# Patient Record
Sex: MALE | Race: ASIAN | NOT HISPANIC OR LATINO | URBAN - METROPOLITAN AREA
[De-identification: names, ages, dates, MRNs, and addresses within clinical notes are randomized per-mention and may not be internally consistent; named-entity substitution may affect disease eponyms.]

---

## 2018-03-02 ENCOUNTER — OFFICE VISIT - HEALTHEAST (OUTPATIENT)
Dept: FAMILY MEDICINE | Facility: CLINIC | Age: 23
End: 2018-03-02

## 2018-03-02 DIAGNOSIS — Z00.00 ROUTINE GENERAL MEDICAL EXAMINATION AT A HEALTH CARE FACILITY: ICD-10-CM

## 2018-03-02 DIAGNOSIS — Z23 NEED FOR VACCINATION: ICD-10-CM

## 2018-03-02 ASSESSMENT — MIFFLIN-ST. JEOR: SCORE: 1478

## 2018-07-18 ENCOUNTER — COMMUNICATION - HEALTHEAST (OUTPATIENT)
Dept: FAMILY MEDICINE | Facility: CLINIC | Age: 23
End: 2018-07-18

## 2018-08-27 ENCOUNTER — OFFICE VISIT - HEALTHEAST (OUTPATIENT)
Dept: FAMILY MEDICINE | Facility: CLINIC | Age: 23
End: 2018-08-27

## 2018-08-27 DIAGNOSIS — Z01.84 IMMUNITY STATUS TESTING: ICD-10-CM

## 2018-08-27 DIAGNOSIS — Z11.1 SCREENING EXAMINATION FOR PULMONARY TUBERCULOSIS: ICD-10-CM

## 2018-08-27 DIAGNOSIS — Z23 NEED FOR VACCINATION: ICD-10-CM

## 2018-08-27 ASSESSMENT — MIFFLIN-ST. JEOR: SCORE: 1465.26

## 2018-08-28 LAB
HBV SURFACE AB SERPL IA-ACNC: POSITIVE M[IU]/ML
HBV SURFACE AG SERPL QL IA: NEGATIVE
RUBV IGG SERPL QL IA: POSITIVE

## 2018-08-29 ENCOUNTER — AMBULATORY - HEALTHEAST (OUTPATIENT)
Dept: NURSING | Facility: CLINIC | Age: 23
End: 2018-08-29

## 2018-08-29 ENCOUNTER — AMBULATORY - HEALTHEAST (OUTPATIENT)
Dept: FAMILY MEDICINE | Facility: CLINIC | Age: 23
End: 2018-08-29

## 2018-08-29 DIAGNOSIS — Z23 NEED FOR VACCINATION: ICD-10-CM

## 2018-08-29 LAB
GAMMA INTERFERON BACKGROUND BLD IA-ACNC: 0.19 IU/ML
HBV CORE AB SERPL QL IA: POSITIVE
M TB IFN-G BLD-IMP: NEGATIVE
MEV IGG SER IA-ACNC: POSITIVE
MITOGEN IGNF BCKGRD COR BLD-ACNC: 0.01 IU/ML
MITOGEN IGNF BCKGRD COR BLD-ACNC: 0.02 IU/ML
MUV IGG SER QL IA: POSITIVE
QTF INTERPRETATION: NORMAL
QTF MITOGEN - NIL: >10 IU/ML
VZV IGG SER QL IA: NORMAL

## 2018-10-24 ENCOUNTER — AMBULATORY - HEALTHEAST (OUTPATIENT)
Dept: NURSING | Facility: CLINIC | Age: 23
End: 2018-10-24

## 2019-07-31 ENCOUNTER — RECORDS - HEALTHEAST (OUTPATIENT)
Dept: LAB | Facility: HOSPITAL | Age: 24
End: 2019-07-31

## 2019-08-02 LAB
GAMMA INTERFERON BACKGROUND BLD IA-ACNC: 0.53 IU/ML
M TB IFN-G BLD-IMP: NEGATIVE
MITOGEN IGNF BCKGRD COR BLD-ACNC: -0.21 IU/ML
MITOGEN IGNF BCKGRD COR BLD-ACNC: -0.28 IU/ML
QTF INTERPRETATION: NORMAL
QTF MITOGEN - NIL: 9.04 IU/ML

## 2020-01-09 ENCOUNTER — AMBULATORY - HEALTHEAST (OUTPATIENT)
Dept: FAMILY MEDICINE | Facility: CLINIC | Age: 25
End: 2020-01-09

## 2020-01-09 DIAGNOSIS — Z20.828 EXPOSURE TO THE FLU: ICD-10-CM

## 2020-04-07 ENCOUNTER — OFFICE VISIT - HEALTHEAST (OUTPATIENT)
Dept: FAMILY MEDICINE | Facility: CLINIC | Age: 25
End: 2020-04-07

## 2020-04-07 DIAGNOSIS — J31.0 RHINITIS, UNSPECIFIED TYPE: ICD-10-CM

## 2020-11-27 ENCOUNTER — OFFICE VISIT - HEALTHEAST (OUTPATIENT)
Dept: FAMILY MEDICINE | Facility: CLINIC | Age: 25
End: 2020-11-27

## 2020-11-27 DIAGNOSIS — N50.89 LUMP IN TESTIS: ICD-10-CM

## 2020-11-27 DIAGNOSIS — Z23 NEED FOR IMMUNIZATION AGAINST INFLUENZA: ICD-10-CM

## 2020-11-27 DIAGNOSIS — Z00.00 ROUTINE GENERAL MEDICAL EXAMINATION AT A HEALTH CARE FACILITY: ICD-10-CM

## 2020-11-27 DIAGNOSIS — N04.9 NEPHROTIC SYNDROME WITH PATHOLOGICAL LESION IN KIDNEY: ICD-10-CM

## 2020-11-27 LAB
ALBUMIN UR-MCNC: NEGATIVE MG/DL
APPEARANCE UR: CLEAR
BILIRUB UR QL STRIP: NEGATIVE
COLOR UR AUTO: YELLOW
GLUCOSE UR STRIP-MCNC: NEGATIVE MG/DL
HGB UR QL STRIP: NEGATIVE
KETONES UR STRIP-MCNC: NEGATIVE MG/DL
LEUKOCYTE ESTERASE UR QL STRIP: NEGATIVE
NITRATE UR QL: NEGATIVE
PH UR STRIP: 6 [PH] (ref 5–8)
SP GR UR STRIP: 1.02 (ref 1–1.03)
UROBILINOGEN UR STRIP-ACNC: NORMAL

## 2020-11-27 ASSESSMENT — MIFFLIN-ST. JEOR: SCORE: 1573.92

## 2021-03-23 ENCOUNTER — OFFICE VISIT - HEALTHEAST (OUTPATIENT)
Dept: FAMILY MEDICINE | Facility: CLINIC | Age: 26
End: 2021-03-23

## 2021-03-23 DIAGNOSIS — R50.83 POST-VACCINATION FEVER: ICD-10-CM

## 2021-03-23 DIAGNOSIS — R59.0 AXILLARY ADENOPATHY: ICD-10-CM

## 2021-03-23 ASSESSMENT — MIFFLIN-ST. JEOR: SCORE: 1547.84

## 2021-03-25 ENCOUNTER — OFFICE VISIT - HEALTHEAST (OUTPATIENT)
Dept: FAMILY MEDICINE | Facility: CLINIC | Age: 26
End: 2021-03-25

## 2021-03-25 DIAGNOSIS — R53.83 FATIGUE, UNSPECIFIED TYPE: ICD-10-CM

## 2021-03-25 DIAGNOSIS — R22.32 AXILLARY MASS, LEFT: ICD-10-CM

## 2021-03-25 DIAGNOSIS — L50.9 HIVES: ICD-10-CM

## 2021-03-25 LAB
BASOPHILS # BLD AUTO: 0 THOU/UL (ref 0–0.2)
BASOPHILS NFR BLD AUTO: 1 % (ref 0–2)
EOSINOPHIL # BLD AUTO: 0.4 THOU/UL (ref 0–0.4)
EOSINOPHIL NFR BLD AUTO: 6 % (ref 0–6)
ERYTHROCYTE [DISTWIDTH] IN BLOOD BY AUTOMATED COUNT: 12.8 % (ref 11–14.5)
HCT VFR BLD AUTO: 44.2 % (ref 40–54)
HGB BLD-MCNC: 14.2 G/DL (ref 14–18)
IMM GRANULOCYTES # BLD: 0 THOU/UL
IMM GRANULOCYTES NFR BLD: 0 %
LYMPHOCYTES # BLD AUTO: 2.2 THOU/UL (ref 0.8–4.4)
LYMPHOCYTES NFR BLD AUTO: 33 % (ref 20–40)
MCH RBC QN AUTO: 25.7 PG (ref 27–34)
MCHC RBC AUTO-ENTMCNC: 32.1 G/DL (ref 32–36)
MCV RBC AUTO: 80 FL (ref 80–100)
MONOCYTES # BLD AUTO: 0.8 THOU/UL (ref 0–0.9)
MONOCYTES NFR BLD AUTO: 11 % (ref 2–10)
MONOCYTES NFR BLD AUTO: NEGATIVE %
NEUTROPHILS # BLD AUTO: 3.3 THOU/UL (ref 2–7.7)
NEUTROPHILS NFR BLD AUTO: 50 % (ref 50–70)
PLATELET # BLD AUTO: 288 THOU/UL (ref 140–440)
PMV BLD AUTO: 9.8 FL (ref 7–10)
RBC # BLD AUTO: 5.52 MILL/UL (ref 4.4–6.2)
WBC: 6.7 THOU/UL (ref 4–11)

## 2021-03-26 ENCOUNTER — OFFICE VISIT - HEALTHEAST (OUTPATIENT)
Dept: FAMILY MEDICINE | Facility: CLINIC | Age: 26
End: 2021-03-26

## 2021-03-26 DIAGNOSIS — J02.9 SORE THROAT: ICD-10-CM

## 2021-03-26 DIAGNOSIS — J02.0 STREPTOCOCCAL PHARYNGITIS: ICD-10-CM

## 2021-03-26 DIAGNOSIS — L50.9 HIVES: ICD-10-CM

## 2021-03-26 LAB — DEPRECATED S PYO AG THROAT QL EIA: ABNORMAL

## 2021-03-26 RX ORDER — PREDNISONE 20 MG/1
20 TABLET ORAL 2 TIMES DAILY
Qty: 10 TABLET | Refills: 0 | Status: SHIPPED | OUTPATIENT
Start: 2021-03-26

## 2021-03-26 ASSESSMENT — MIFFLIN-ST. JEOR: SCORE: 1547.56

## 2021-03-28 ENCOUNTER — COMMUNICATION - HEALTHEAST (OUTPATIENT)
Dept: SCHEDULING | Facility: CLINIC | Age: 26
End: 2021-03-28

## 2021-04-12 ENCOUNTER — HOSPITAL ENCOUNTER (OUTPATIENT)
Dept: ULTRASOUND IMAGING | Facility: HOSPITAL | Age: 26
Discharge: HOME | End: 2021-04-12
Attending: FAMILY MEDICINE

## 2021-04-12 DIAGNOSIS — N50.89 LUMP IN TESTIS: ICD-10-CM

## 2021-05-27 VITALS
TEMPERATURE: 98.8 F | RESPIRATION RATE: 12 BRPM | HEART RATE: 65 BPM | DIASTOLIC BLOOD PRESSURE: 70 MMHG | OXYGEN SATURATION: 99 % | SYSTOLIC BLOOD PRESSURE: 138 MMHG

## 2021-06-01 VITALS — WEIGHT: 132.25 LBS | HEIGHT: 62 IN | BODY MASS INDEX: 24.34 KG/M2

## 2021-06-02 VITALS — HEIGHT: 62 IN | BODY MASS INDEX: 23.69 KG/M2 | WEIGHT: 128.75 LBS

## 2021-06-05 VITALS
BODY MASS INDEX: 27.32 KG/M2 | HEIGHT: 62 IN | SYSTOLIC BLOOD PRESSURE: 116 MMHG | HEART RATE: 76 BPM | DIASTOLIC BLOOD PRESSURE: 60 MMHG | TEMPERATURE: 98.2 F | WEIGHT: 148.44 LBS | RESPIRATION RATE: 18 BRPM

## 2021-06-05 VITALS
WEIGHT: 148.5 LBS | RESPIRATION RATE: 18 BRPM | HEART RATE: 80 BPM | BODY MASS INDEX: 27.33 KG/M2 | SYSTOLIC BLOOD PRESSURE: 124 MMHG | TEMPERATURE: 99.3 F | DIASTOLIC BLOOD PRESSURE: 78 MMHG | HEIGHT: 62 IN

## 2021-06-05 VITALS
HEIGHT: 62 IN | SYSTOLIC BLOOD PRESSURE: 112 MMHG | TEMPERATURE: 98.2 F | DIASTOLIC BLOOD PRESSURE: 64 MMHG | WEIGHT: 154.25 LBS | RESPIRATION RATE: 18 BRPM | BODY MASS INDEX: 28.39 KG/M2 | OXYGEN SATURATION: 98 % | HEART RATE: 78 BPM

## 2021-06-07 NOTE — PROGRESS NOTES
"      Abilio Vitale is a 24 y.o. male who is being evaluated via a billable telephone visit.      The patient has been notified of following:     \"This telephone visit will be conducted via a call between you and your physician/provider. We have found that certain health care needs can be provided without the need for a physical exam.  This service lets us provide the care you need with a short phone conversation.  If a prescription is necessary we can send it directly to your pharmacy.  If lab work is needed we can place an order for that and you can then stop by our lab to have the test done at a later time.    If during the course of the call the physician/provider feels a telephone visit is not appropriate, you will not be charged for this service.\"     Physician has received verbal consent for a Telephone Visit from the patient? Yes    Abilio Vitale complains of    Chief Complaint   Patient presents with     Nasal Congestion     started on 4/6,      Nasal congestion and difficulty breathing through his noes.  Usually happens once or twice a year. No sore throat or fever or pain in face. Always happens in spring. No one else around is having similar symptoms.     He has been using benadryl at night, but it \"didn't work\" still feels stuffy in the morning.    He has not tried an Over-the-counter decongestant    H is wondering if he can return to work - I said given mild symptoms this is completely acceptable    ALLERGIES  Patient has no known allergies.        Assessment/Plan:  Discussed with patient that his symptoms could be consistent with either allergies or upper respiratory infection.  Discussed use of Over-the-counter decongestants and cautious use of Afrin nasal spray, no more than 3 days in a row.     He is fine to go to work    1. Rhinitis, unspecified type  - fluticasone (VERAMYST) 27.5 mcg/actuation nasal spray; 2 sprays once a day in each nostril  Dispense: 10 g; Refill: 1        Phone call duration:  " 10  minutes    Haylee Florez MD

## 2021-06-16 NOTE — PROGRESS NOTES
ASSESSMENT and plan   1. Sore throat  He has had a sore throat since yesterday rapid strep done today and Covid test.  No fever noted  - Rapid Strep A Screen- Throat Swab  - Symptomatic COVID-19 Virus (CORONAVIRUS) PCR    2. Streptococcal pharyngitis  Confirmed by rapid strep test amoxicillin started side effects of medication discussed  - amoxicillin (AMOXIL) 875 MG tablet; Take 1 tablet (875 mg total) by mouth 2 (two) times a day for 10 days.  Dispense: 20 tablet; Refill: 0    3. Hives    He developed hives throughout his body and his back legs arms and torso.  Seen in emergency room yesterday given cetirizine I have prescribed prednisone for him side effects discussed  - predniSONE (DELTASONE) 20 MG tablet; Take 20 mg by mouth 2 (two) times a day.  Dispense: 10 tablet; Refill: 0    There are no Patient Instructions on file for this visit.    Orders Placed This Encounter   Procedures     Rapid Strep A Screen- Throat Swab     Symptomatic COVID-19 Virus (CORONAVIRUS) PCR     Order Specific Question:   Reason?     Answer:   No Procedure     Order Specific Question:   Asymptomatic or Symptomatic:     Answer:   Symptomatic     Order Specific Question:   Symptomatic for COVID-19 as defined by CDC?     Answer:   Yes     Order Specific Question:   Employed in healthcare setting?     Answer:   Yes     Order Specific Question:   Employed or Provider/Resident/Faculty at [facility]?     Answer:   Yes - Employed or Provider/Resident/Faculty at [facility]     Order Specific Question:   Resident lives or works in a congregate care/living setting?     Answer:   No     COVID-19 Virus PCR MRF     There are no discontinued medications.    No follow-ups on file.    CHIEF COMPLAINT:  Chief Complaint   Patient presents with     Sore Throat       HISTORY OF PRESENT ILLNESS:  Abilio Knox is a 25 y.o. male who is here for a follow-up of the rash and body ache.  He also notes that he had a sore throat.  Patient noticed that he  "was having body ache and lymph node swelling in his left axilla after receiving his second coronavirus vaccine approximately 4 days ago.  Since then he has been having body ache and feeling fatigued.  He went to the urgent care yesterday because he was having a rash that was occurring throughout his body and was very itchy to give him cetirizine.  He says this morning he notes a sore throat occupational health would require him to have a Covid test before he can return to work on Monday.  He notes no diarrhea shortness of breath chest pain or headache    REVIEW OF SYSTEMS:     Skin positive for rash and body now developing into hives as mentioned in the HPI  Musculoskeletal positive for body ache  HEENT positive for sore throat  All other systems are negative.    PFSH:    Works at a medical clinic    TOBACCO USE:  Social History     Tobacco Use   Smoking Status Never Smoker   Smokeless Tobacco Never Used       VITALS:  Vitals:    03/26/21 1006   BP: 116/60   Pulse: 76   Resp: 18   Temp: 98.2  F (36.8  C)   TempSrc: Oral   Weight: 148 lb 7 oz (67.3 kg)   Height: 5' 2\" (1.575 m)     Wt Readings from Last 3 Encounters:   03/26/21 148 lb 7 oz (67.3 kg)   03/23/21 148 lb 8 oz (67.4 kg)   01/08/21 150 lb (68 kg)       PHYSICAL EXAM:  Interactive male sitting in exam room no acute distress  HEENT neck supple mucous membranes moist, erythema noted on the posterior pharyngeal wall is no lymph enlargement noted  Respiratory system clear to auscultation good breath sounds no wheezes no crackles  Skin multiple areas of urticaria noted on his forearms torso back legs and anterior chest wall signs of excoriation noted  CNS cranial nerves II to XII intact gait is normal reflexes are brisk    DATA REVIEWED:  Additional History from Old Records Summarized (2):   Decision to Obtain Records (1): 0  Radiology Tests Summarized or Ordered (1): 0  Labs Reviewed or Ordered (1): 1  Medicine Test Summarized or Ordered (1): 0  Independent " Review of EKG or X-RAY(2 each): 0    The visit lasted a total of 20 minutes     MEDICATIONS:  Current Outpatient Medications   Medication Sig Dispense Refill     acetaminophen (TYLENOL) 325 MG tablet Take 325-650 mg by mouth every 6 (six) hours as needed.       amoxicillin (AMOXIL) 875 MG tablet Take 1 tablet (875 mg total) by mouth 2 (two) times a day for 10 days. 20 tablet 0     fluticasone (VERAMYST) 27.5 mcg/actuation nasal spray 2 sprays once a day in each nostril 10 g 1     fluticasone propionate (FLONASE) 50 mcg/actuation nasal spray        predniSONE (DELTASONE) 20 MG tablet Take 20 mg by mouth 2 (two) times a day. 10 tablet 0     No current facility-administered medications for this visit.      Jerry kaur md

## 2021-06-16 NOTE — PROGRESS NOTES
[facility] Well Child Check    ASSESSMENT & PLAN  Abilio Vitale is a 22 y.o. who has normal growth and normal development.    Diagnoses and all orders for this visit:    Routine general medical examination at a health care facility    Need for vaccination  -     Influenza, Seasonal Quad, Preservative Free 36+ Months      Recheck in 1 year.    IMMUNIZATIONS/LABS  Immunizations were reviewed and orders were placed as appropriate.     Immunization History   Administered Date(s) Administered     Influenza,seasonal quad, PF, 36+MOS 03/02/2018     Tdap 06/25/2010     Varicella 06/25/2010         REFERRALS  Dental:  The patient has already established care with a dentist.  Other:  No additional referrals were made at this time.    ANTICIPATORY GUIDANCE  I have reviewed age appropriate anticipatory guidance.    HEALTH HISTORY  Do you have any concerns that you'd like to discuss today?: No concerns   Has not had asthma symptoms for years.  ACT = 25      Roomed by: Lakisha Casanova.    Refills needed? No    Do you have any forms that need to be filled out? No        Do you have any significant health concerns in your family history?: No  No family history on file.  Since your last visit, have there been any major changes in your family, such as a move, job change, separation, divorce, or death in the family?: No  Has a lack of transportation kept you from medical appointments?: N/A    Home  Who lives in your home?:  Mother, 2 siblings  Social History     Social History Narrative     No narrative on file     Do you have any concerns about losing your housing?: No  Is your housing safe and comfortable?: Yes  Do you have any trouble with sleep?:  No    Education  What school do you child attend?:  [employer].  Nursing.  What grade are you in?:  4 semesters of college.  How do you perform in school (grades, behavior, attention, homework?: Good.     Eating  Do you eat regular meals including fruits and vegetables?:  yes  What  "are you drinking (cow's milk, water, soda, juice, sports drinks, energy drinks, etc)?: cow's milk- 1%, water  Have you been worried that you don't have enough food?: No  Do you have concerns about your body or appearance?:  No    Activities  Do you have friends?:  yes  Do you get at least one hour of physical activity per day?:  no  How many hours a day are you in front of a screen other than for schoolwork (computer, TV, phone)?:  3-4 What do you do for exercise?:  Soccer.  Do you have interest/participate in community activities/volunteers/school sports?:  no    MENTAL HEALTH SCREENING  No Data Recorded  No Data Recorded    VISION/HEARING  Vision: Completed. See Results  Hearing:  Completed. See Results     Hearing Screening    125Hz 250Hz 500Hz 1000Hz 2000Hz 3000Hz 4000Hz 6000Hz 8000Hz   Right ear:   40 40 20  20 20    Left ear:   40 40 20  20 20       Visual Acuity Screening    Right eye Left eye Both eyes   Without correction:      With correction: 20/25 20/20 20/20       TB Risk Assessment: The patient and/or parent/guardian answer positive to:  parents born outside of the [place]    Dyslipidemia Risk Screening  Have either of your parents or any of your grandparents had a stroke or heart attack before age 55?: No  Any parents with high cholesterol or currently taking medications to treat?: No     Dental  When was the last time you saw the dentist?: over 12 months ago        Patient Active Problem List   Diagnosis     Nephrotic Syndrome       Drugs  Does the patient use tobacco/alcohol/drugs?:  no    Safety  Does the patient have any safety concerns (peer or home)?:  no  Does the patient use safety belts, helmets and other safety equipment?:  yes    Sex  Have you ever had sex?:  Yes  Uses condoms.    MEASUREMENTS  Height:  5' 2.24\" (1.581 m)  Weight: 132 lb 4 oz (60 kg)  BMI: Body mass index is 24 kg/(m^2).  Blood Pressure: 126/72  Growth percentile SmartLinks can only be used for patients less than 20 years " old.    PHYSICAL EXAM  Physical Exam   Eyes: EOM full, pupils normal, conjunctivae normal  Ears: TM's and canals normal  Oropharynx: normal  Neck: supple without adenopathy or thyromegaly  Lungs: normal  Heart: regular rhythm, normal rate, no murmur  Abdomen: no HSM, mass or tenderness  : uncircumcised, penis with pearly penile papules at corona, testes normal, no inguinal hernia or adenopathy  Extremities: FROM, normal strength and sensation

## 2021-06-16 NOTE — PATIENT INSTRUCTIONS - HE
Please take any of the following medications for your fever ibuprofen or Naprosyn or Aleve or Advil.  The dose should not exceed 400 mg.  This typically is 2 tablets of this medication take this medication twice daily after meals      If you develop any nausea, increasing chills or fever please contact someone at your workplace immediately

## 2021-06-16 NOTE — PROGRESS NOTES
ASSESSMENT and plan   1. Axillary adenopathy  Symptoms noted this morning the area is not tender only swollen.  Advised patient to use anti-inflammatories as needed after meals.  He can use an ice pack if the pain becomes present or the area becomes more swollen he should be seen again.    2. Post-vaccination fever    Fever noted in clinic this is 5 hours after Tylenol dosing.  He has no sore throat no chills no feelings of nausea.  If his symptoms intensify he is to stop working take antipyretics and remain at home.  He should not engage in any heavy exercise for minimum of 72 hours        Patient Instructions   Please take any of the following medications for your fever ibuprofen or Naprosyn or Aleve or Advil.  The dose should not exceed 400 mg.  This typically is 2 tablets of this medication take this medication twice daily after meals      If you develop any nausea, increasing chills or fever please contact someone at your workplace immediately      No orders of the defined types were placed in this encounter.    There are no discontinued medications.    No follow-ups on file.    CHIEF COMPLAINT:  Chief Complaint   Patient presents with     Edema     this morning       HISTORY OF PRESENT ILLNESS:  Abilio Knox is a 25 y.o. male who is here because his nose is swelling in his left axillary area this morning.  He felt feverish yesterday after receiving a second coronavirus vaccination.  He reports that he took a dose of Tylenol last night and again this morning because he felt hot and had some chills denies shortness of breath GI upset headaches or dizziness he slept well.  His appetite is normal.  He feels swelling in the left armpit area this morning only the area is not tender painful.  He did not have any of the symptoms with his first coronavirus vaccination    REVIEW OF SYSTEMS:     General positive for fever and chills  Skin positive for swollen area in left arm.  All other systems are  "negative.    PFSH:  Works in medical clinic as a [employer]    TOBACCO USE:  Social History     Tobacco Use   Smoking Status Never Smoker   Smokeless Tobacco Never Used       VITALS:  Vitals:    03/23/21 0942   BP: 124/78   Pulse: 80   Resp: 18   Temp: 99.3  F (37.4  C)   TempSrc: Oral   Weight: 148 lb 8 oz (67.4 kg)   Height: 5' 2\" (1.575 m)     Wt Readings from Last 3 Encounters:   03/23/21 148 lb 8 oz (67.4 kg)   01/08/21 150 lb (68 kg)   11/27/20 154 lb 4 oz (70 kg)       PHYSICAL EXAM:  Interactive male sitting comfortably in exam room no acute distress  HEENT neck supple mucous members moist, there is no lymph enlargement noted in the neck, oral cavity shows no exudate no erythema  Respiratory system clear to auscultation equal breath sounds no wheeze no crackles  CVS regular rate rhythm no murmurs rubs gallops appreciated  Lymphatic system 1 solitary freely mobile lymph node noted in the left axilla approximately 3 cm lateral to the left pectoralis major muscle.  CNS cranial nerves II to XII intact gait is normal reflexes are brisk  Psych oriented x3 seems comfortable at rest    DATA REVIEWED:  Additional History from Old Records Summarized (2): 0  Decision to Obtain Records (1): 0  Radiology Tests Summarized or Ordered (1): 0  Labs Reviewed or Ordered (1): 0  Medicine Test Summarized or Ordered (1): 0  Independent Review of EKG or X-RAY(2 each): 0    The visit lasted a total of 20 minutes     MEDICATIONS:  Current Outpatient Medications   Medication Sig Dispense Refill     acetaminophen (TYLENOL) 325 MG tablet Take 325-650 mg by mouth every 6 (six) hours as needed.       fluticasone (VERAMYST) 27.5 mcg/actuation nasal spray 2 sprays once a day in each nostril 10 g 1     No current facility-administered medications for this visit.      Татьяна MILLS  "

## 2021-06-16 NOTE — PATIENT INSTRUCTIONS - HE
Your complete blood count and and test for mononucleosis is normal.  It is unclear whether or not the lump underneath your left arm may possibly be due to a reaction from the COVID-19 vaccine.  If it is not improved over the next several days, I would like you to follow-up with your primary care provider for further evaluation.    As far as your hives go, I would like you to continue to take Zyrtec once a day.  If your symptoms overall are not improving or if you develop any new or concerning symptoms, please follow-up with your doctor in the next several days.

## 2021-06-16 NOTE — PROGRESS NOTES
Assessment:     1. Fatigue, unspecified type  Mononucleosis Screen    HM1(CBC and Differential)   2. Axillary mass, left  Mononucleosis Screen    HM1(CBC and Differential)   3. Hives            Plan:     Patient with fatigue and development of urticaria today along with a fleshy lump that he noticed the day after he got his 2nd COVID-19 vaccination in his left axilla. CBC obtained today which is unremarkable. Mononucleosis screen obtained and is negative as well. His lump under his arm does not feel firm and almost feels like an edematous area or possibly a lipoma. Recommend he continue to monitor this. It is unclear whether or not this may be related to the COVID-19 vaccine and recommended he follow-up with his primary care provider next week if this is still persisting. CBC and mono screen done today and results reviewed with patient all of which are unremarkable. Recommend he continue the cetirizine for the urticaria. Unclear as to the etiology of the urticaria. He will follow-up if he develops any new or concerning symptoms. Patient is agreeable with this plan.      Patient Instructions   Your complete blood count and and test for mononucleosis is normal.  It is unclear whether or not the lump underneath your left arm may possibly be due to a reaction from the COVID-19 vaccine.  If it is not improved over the next several days, I would like you to follow-up with your primary care provider for further evaluation.    As far as your hives go, I would like you to continue to take Zyrtec once a day.  If your symptoms overall are not improving or if you develop any new or concerning symptoms, please follow-up with your doctor in the next several days.    Subjective:       25 y.o. male presents for evaluation patient of a 1 day history of fatigue, slight nausea, and development of an itchy rash on his body. He just received his 2nd COVID-19 Pfizer vaccine 3 days ago and did have some low-grade fever, chills, and body  aches the evening of his vaccination. The following day he felt somewhat better but noticed a large lump underneath his left axilla. This is the same arm that he received his COVID-19 vaccination in. He has not had any further fevers, chills, but has continued to feel somewhat fatigued through today and is now here today for further evaluation. He did take one dose of cetirizine about an hour ago to help with the rash but has not noticed any difference yet. He denies any abdominal pain, vomiting, diarrhea, cough, or any other upper respiratory symptoms. He denies any shortness of breath.    Patient Active Problem List   Diagnosis     Nephrotic Syndrome       No past medical history on file.    No past surgical history on file.    Current Outpatient Medications on File Prior to Visit   Medication Sig Dispense Refill     acetaminophen (TYLENOL) 325 MG tablet Take 325-650 mg by mouth every 6 (six) hours as needed.       fluticasone (VERAMYST) 27.5 mcg/actuation nasal spray 2 sprays once a day in each nostril 10 g 1     fluticasone propionate (FLONASE) 50 mcg/actuation nasal spray        No current facility-administered medications on file prior to visit.        No Known Allergies      Review of Systems  A 12 point comprehensive review of systems was negative except as noted.      Objective:     Vitals:    03/25/21 1744   BP: 138/70   Pulse: 65   Resp: 12   Temp: 98.8  F (37.1  C)   SpO2: 99%                                  General Appearance:      Vitals:    03/25/21 1744   BP: 138/70   Pulse: 65   Resp: 12   Temp: 98.8  F (37.1  C)   SpO2: 99%           Alert, pleasant, cooperative, no distress, appears stated age   Head:    Normocephalic, without obvious abnormality, atraumatic   Eyes:    Conjunctiva/corneas clear   Ears:    Normal TM's without erythema or bulging. Normal external ear canals, both ears   Nose:   Nares normal, septum midline, mucosa normal, no drainage    or sinus tenderness   Throat:   Lips, mucosa,  and tongue normal; teeth and gums normal.  No tonsilar hypertrophy or exudate.   Neck:   Supple, symmetrical, trachea midline, no adenopathy   Axilla: Patient has approximately 4 cm x 5 cm soft fleshy lump that is nontender. There is no overlying skin changes.   Lungs:     Clear to auscultation bilaterally without wheezes, rales, or rhonchi, respirations unlabored    Heart:    Regular rate and rhythm, S1 and S2 normal, no murmur, rub or gallop       Extremities:   Extremities normal, atraumatic, no cyanosis or edema   Skin:  Patient with an urticarial rash on his abdomen, back, and arms.          Recent Results (from the past 24 hour(s))   Mononucleosis Screen   Result Value Ref Range    Mono Screen Negative Negative   HM1 (CBC with Diff)   Result Value Ref Range    WBC 6.7 4.0 - 11.0 thou/uL    RBC 5.52 4.40 - 6.20 mill/uL    Hemoglobin 14.2 14.0 - 18.0 g/dL    Hematocrit 44.2 40.0 - 54.0 %    MCV 80 80 - 100 fL    MCH 25.7 (L) 27.0 - 34.0 pg    MCHC 32.1 32.0 - 36.0 g/dL    RDW 12.8 11.0 - 14.5 %    Platelets 288 140 - 440 thou/uL    MPV 9.8 7.0 - 10.0 fL    Neutrophils % 50 50 - 70 %    Lymphocytes % 33 20 - 40 %    Monocytes % 11 (H) 2 - 10 %    Eosinophils % 6 0 - 6 %    Basophils % 1 0 - 2 %    Immature Granulocyte % 0 <=0 %    Neutrophils Absolute 3.3 2.0 - 7.7 thou/uL    Lymphocytes Absolute 2.2 0.8 - 4.4 thou/uL    Monocytes Absolute 0.8 0.0 - 0.9 thou/uL    Eosinophils Absolute 0.4 0.0 - 0.4 thou/uL    Basophils Absolute 0.0 0.0 - 0.2 thou/uL    Immature Granulocyte Absolute 0.0 <=0.0 thou/uL         No results found.         This note has been dictated using voice recognition software. Any grammatical or context distortions are unintentional and inherent to the software

## 2021-06-20 NOTE — PROGRESS NOTES
Assessment: /    Plan:    1. Immunity status testing  Hepatitis B Surface Antibody (Anti-HBs) Vaccine Check    Hepatitis B Surface Antigen (HBsAG)    Rubeola Antibody, IgG    Mumps Antibody, IgG    Rubella Antibody, IgG    Varicella Zoster Antibody, IgG    Hepatitis B Core Antibody (Anti-HBc)   2. Screening examination for pulmonary tuberculosis  QTF-Mycobacterium tuberculosis by QuantiFERON-TB Gold Plus   3. Need for vaccination  Td, Preservative Free (green label)    Hepatitis A adult 2 dose IM (19 yr & older)       He had some immunizations in [place], some in [place], and some in [place].  None of these are included in his immunization record.  Therefore, I am checking immune status, rather than give multiple doses of all of those immunizations.    Results indicate immunity to everything except for equivocal on varicella.  He was given a second varicella dose when the result was known.    He has hepatitis B core antibody and surface antibody, which means that he was exposed to the virus and cleared the infection.  Hep B surface antigen is negative.    TD and hepatitis A boosters were given.    Form was completed.      Subjective:    HPI:  Abilio Vitale is a 22-year-old male presenting for immunization update.  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  He has a form needing completion regarding immune status to various illnesses.    Social Hx: He came to the [place] in 2007 to [place], for 10 months, and then was in [place] until 2010, when he came to [place].    Review of Systems: No fever or cough. He has not had asthma symptoms for many years.        Immunization History   Administered Date(s) Administered     HPV Quadrivalent 04/29/2014, 10/06/2014, 12/10/2015     Hep A, Adult IM (19yr & older) 08/27/2018     Hepatitis A, Ped/Adol 2 Dose IM (18yr & under) 04/29/2014     Hepatitis B: Immune 08/27/2018     Influenza, inj, historic,unspecified 12/10/2015     Influenza,seasonal quad, PF, 36+MOS  10/25/2013, 09/26/2014, 03/02/2018     Influenza,seasonal, Inj IIV3 04/29/2014     Measles: Immune 08/27/2018     Meningococcal MCV4O 04/29/2014     Meningococcal MPSV4, SQ 10/06/2014     Mumps: Immune 08/27/2018     Rubella: Immune 08/27/2018     Td, adult adsorbed, PF 08/27/2018     Tdap 06/25/2010, 12/10/2015     Varicella 06/25/2010, 08/29/2018         Current Outpatient Prescriptions   Medication Sig Dispense Refill     acetaminophen (TYLENOL) 325 MG tablet Take 325-650 mg by mouth every 6 (six) hours as needed.       No current facility-administered medications for this visit.          Objective:    Vitals:    08/27/18 0813   BP: 100/64   Pulse: 71   Resp: 19   Temp: 97.7  F (36.5  C)   SpO2: 98%       Gen:  NAD, VSS  Lungs:  normal  Heart:  normal          ADDITIONAL HISTORY SUMMARIZED (2): None.  DECISION TO OBTAIN EXTRA INFORMATION (1): None.   RADIOLOGY TESTS (1): None.  LABS (1): Ordered.  MEDICINE TESTS (1): None.  INDEPENDENT REVIEW (2 each): None.     Total Data Points: 1

## 2021-06-26 ENCOUNTER — HEALTH MAINTENANCE LETTER (OUTPATIENT)
Age: 26
End: 2021-06-26

## 2021-06-30 NOTE — PROGRESS NOTES
Progress Notes by Noel Beth MD at 11/27/2020 12:00 PM     Author: Noel Beth MD Service: -- Author Type: Physician    Filed: 12/1/2020  9:00 PM Encounter Date: 11/27/2020 Status: Signed    : Noel Beth MD (Physician)       MALE PREVENTATIVE EXAM    Assessment and Plan:     Patient has been advised of split billing requirements and indicates understanding: Yes    Abilio Knox was seen today for annual exam.    Routine general medical examination at a health care facility    Lump in testis  -     US Scrotum and Testicles W Duplex Ltd; Future    Need for immunization against influenza  -     Influenza, Seasonal Quad, PF =/> 6months    Nephrotic Syndrome  -     Urinalysis-UC if Indicated        Next follow up:  Return in about 1 year (around 11/27/2021) for Annual physical.    Immunization Review  Adult Imm Review: Due today, orders placed      I discussed the following with the patient:       Subjective:   Chief Complaint: Abilio Vitale is an 25 y.o. male here for a preventative health visit.  {  Patient has been advised of split billing requirements and indicates understanding: Yes  HPI:  Not in school for now.  Might pursue nursing in the future.    Lump on left testis for a few months, not changing.  No pain.  No dysuria or urethral discharge.    Healthy Habits  Are you taking a daily aspirin? No  Do you typically exercising at least 40 min, 3-4 times per week?  Yes  Do you usually eat at least 4 servings of fruit and vegetables a day, include whole grains and fiber and avoid regularly eating high fat foods? Yes  Have you had an eye exam in the past two years? Yes  Do you see a dentist twice per year? NO  Do you have any concerns regarding sleep? No    Safety Screen  If you own firearms, are they secured in a locked gun cabinet or with trigger locks? The patient does not own any firearms  Do you feel you are safe where you are living?: Yes (11/27/2020 11:55 AM)  Do you feel you are  "safe in your relationship(s)?: Yes (11/27/2020 11:55 AM)      Review of Systems:  Please see above.  The rest of the review of systems are negative for all systems.     Cancer Screening     Patient has no health maintenance due at this time            History     Reviewed By Date/Time Sections Reviewed    Luh Espinoza MA 11/27/2020 11:57 AM Tobacco            Objective:   Vital Signs:   Visit Vitals  /64   Pulse 78   Temp 98.2  F (36.8  C) (Oral)   Resp 18   Ht 5' 2\" (1.575 m)   Wt 154 lb 4 oz (70 kg)   SpO2 98%   BMI 28.21 kg/m           PHYSICAL EXAM  Eyes: EOM full, pupils normal, conjunctivae normal  Ears: TM's and canals normal  Oropharynx: normal  Neck: supple without adenopathy or thyromegaly  Lungs: normal  Heart: regular rhythm, normal rate, no murmur  Abdomen: no HSM, mass or tenderness  : uncircumcised, penis normal, no inguinal hernia or adenopathy.  2 mm firm nodule on lateral left testis  Extremities: FROM, normal strength and sensation             Medication List          Accurate as of November 27, 2020 11:59 PM. If you have any questions, ask your nurse or doctor.            CONTINUE taking these medications    acetaminophen 325 MG tablet  Also known as: TYLENOL  INSTRUCTIONS: Take 325-650 mg by mouth every 6 (six) hours as needed.        fluticasone 27.5 mcg/actuation nasal spray  Also known as: VERAMYST  INSTRUCTIONS: 2 sprays once a day in each nostril               Additional Screenings Completed Today:          "

## 2021-08-09 DIAGNOSIS — L30.9 DERMATITIS: Primary | ICD-10-CM

## 2021-08-09 RX ORDER — TRIAMCINOLONE ACETONIDE 1 MG/G
CREAM TOPICAL 2 TIMES DAILY
Qty: 30 G | Refills: 1 | Status: SHIPPED | OUTPATIENT
Start: 2021-08-09

## 2021-10-16 ENCOUNTER — HEALTH MAINTENANCE LETTER (OUTPATIENT)
Age: 26
End: 2021-10-16

## 2021-10-23 ENCOUNTER — OFFICE VISIT (OUTPATIENT)
Dept: FAMILY MEDICINE | Facility: CLINIC | Age: 26
End: 2021-10-23
Payer: COMMERCIAL

## 2021-10-23 VITALS
TEMPERATURE: 101 F | BODY MASS INDEX: 27.25 KG/M2 | OXYGEN SATURATION: 98 % | RESPIRATION RATE: 18 BRPM | DIASTOLIC BLOOD PRESSURE: 76 MMHG | WEIGHT: 149 LBS | SYSTOLIC BLOOD PRESSURE: 134 MMHG | HEART RATE: 81 BPM

## 2021-10-23 DIAGNOSIS — R30.0 DYSURIA: ICD-10-CM

## 2021-10-23 DIAGNOSIS — R07.0 THROAT PAIN: Primary | ICD-10-CM

## 2021-10-23 LAB
ALBUMIN UR-MCNC: 30 MG/DL
APPEARANCE UR: CLEAR
BACTERIA #/AREA URNS HPF: ABNORMAL /HPF
BILIRUB UR QL STRIP: NEGATIVE
COLOR UR AUTO: YELLOW
DEPRECATED S PYO AG THROAT QL EIA: NEGATIVE
FLUAV AG SPEC QL IA: NEGATIVE
FLUBV AG SPEC QL IA: NEGATIVE
GLUCOSE UR STRIP-MCNC: NEGATIVE MG/DL
HGB UR QL STRIP: NEGATIVE
KETONES UR STRIP-MCNC: ABNORMAL MG/DL
LEUKOCYTE ESTERASE UR QL STRIP: NEGATIVE
MONOCYTES NFR BLD AUTO: NEGATIVE %
NITRATE UR QL: NEGATIVE
PH UR STRIP: 7 [PH] (ref 5–8)
RBC #/AREA URNS AUTO: ABNORMAL /HPF
SP GR UR STRIP: 1.02 (ref 1–1.03)
SQUAMOUS #/AREA URNS AUTO: ABNORMAL /LPF
UROBILINOGEN UR STRIP-ACNC: 0.2 E.U./DL
WBC #/AREA URNS AUTO: ABNORMAL /HPF

## 2021-10-23 PROCEDURE — 99213 OFFICE O/P EST LOW 20 MIN: CPT | Performed by: NURSE PRACTITIONER

## 2021-10-23 PROCEDURE — U0003 INFECTIOUS AGENT DETECTION BY NUCLEIC ACID (DNA OR RNA); SEVERE ACUTE RESPIRATORY SYNDROME CORONAVIRUS 2 (SARS-COV-2) (CORONAVIRUS DISEASE [COVID-19]), AMPLIFIED PROBE TECHNIQUE, MAKING USE OF HIGH THROUGHPUT TECHNOLOGIES AS DESCRIBED BY CMS-2020-01-R: HCPCS | Performed by: NURSE PRACTITIONER

## 2021-10-23 PROCEDURE — 87591 N.GONORRHOEAE DNA AMP PROB: CPT | Performed by: NURSE PRACTITIONER

## 2021-10-23 PROCEDURE — 86308 HETEROPHILE ANTIBODY SCREEN: CPT | Performed by: NURSE PRACTITIONER

## 2021-10-23 PROCEDURE — 87804 INFLUENZA ASSAY W/OPTIC: CPT | Performed by: NURSE PRACTITIONER

## 2021-10-23 PROCEDURE — U0005 INFEC AGEN DETEC AMPLI PROBE: HCPCS | Performed by: NURSE PRACTITIONER

## 2021-10-23 PROCEDURE — 81001 URINALYSIS AUTO W/SCOPE: CPT | Performed by: NURSE PRACTITIONER

## 2021-10-23 PROCEDURE — 36415 COLL VENOUS BLD VENIPUNCTURE: CPT | Performed by: NURSE PRACTITIONER

## 2021-10-23 PROCEDURE — 87651 STREP A DNA AMP PROBE: CPT | Performed by: NURSE PRACTITIONER

## 2021-10-23 PROCEDURE — 87491 CHLMYD TRACH DNA AMP PROBE: CPT | Performed by: NURSE PRACTITIONER

## 2021-10-23 ASSESSMENT — ENCOUNTER SYMPTOMS
RHINORRHEA: 0
HEMATURIA: 0
VOMITING: 0
WHEEZING: 0
SORE THROAT: 1
FREQUENCY: 0
CHILLS: 1
FEVER: 1
DIARRHEA: 0
NAUSEA: 0
DYSURIA: 1
CHEST TIGHTNESS: 0
SINUS PAIN: 0
COUGH: 0

## 2021-10-23 NOTE — PROGRESS NOTES
Assessment & Plan     Throat pain  - Streptococcus A Rapid Screen w/Reflex to PCR - Clinic Collect   - Group A Streptococcus PCR Throat Swab negative   - Mono negative  - Influenza A/B negative  - Covid Test pending  - Increase fluid intake and discussed discharge instructions with patient     Dysuria   UA RESULTS:  Recent Labs   Lab Test 10/23/21  1907   COLOR Yellow   APPEARANCE Clear   URINEGLC Negative   URINEBILI Negative   URINEKETONE Trace*   SG 1.020   UBLD Negative   URINEPH 7.0   PROTEIN 30 *   UROBILINOGEN 0.2   NITRITE Negative   LEUKEST Negative   RBCU 0-2   WBCU None Seen   Chlamydia and Gonorrhea pending   0956}  Return in about 2 days (around 10/25/2021).    Peg Ramirez NP, NP  [facility] PARKERLIGIA Knox is a 25 year old male who presents to clinic today for the following health issues:  Chief Complaint   Patient presents with     History of Present Illness     x 2 days sore throat, fever, chills, felt like body was hot     Abilio is a 25 year old male who presents to clinic today for the following health issues:  #1 URI Adult  Patient is medical assistant at [employer].  Onset of symptoms was 2 day(s) ago.   Course of illness is worsening.    Severity moderately severe  Current and Associated symptoms: fever, chills, sore throat, headache 10/10 yesterday but not today and a slight cough.  Patient has been immunized with both Covid vaccines and had his influenza vaccine two weeks ago.   Treatment measures tried include Tylenol/Ibuprofen and took 4 hours ago  Predisposing factors include seasonal allergies.    #2 UTI  Patient states he has had bilateral back pain started yesterday with occasional dysuria and requests to be checked for a urinary tract infection.  Patient states he has been sexual activity with female partner in the past but not recent. Patient states he has no penial discharge, testicular pain, penial, groin or rectal pain. Patient states he  has not been drinking much water since his sore throat.  Patient denied abdominal pain and patient does not have back pain that radiates into the groin. Patient has not take medication for these symptoms. Patient denied urgency, blood in urine, puss in urine, hesitancy, suprapubic pain and pressure, nausea, vomiting and voiding in small amounts  Predisposing factors include none  Patient denies vomiting, taking Coumadin and GFR less than 30 within the last year      Review of Systems   Constitutional: Positive for chills and fever.   HENT: Positive for sore throat. Negative for postnasal drip, rhinorrhea and sinus pain.    Respiratory: Negative for cough, chest tightness and wheezing.    Gastrointestinal: Negative for diarrhea, nausea and vomiting.   Genitourinary: Positive for decreased urine volume and dysuria. Negative for discharge, frequency, hematuria, penile pain, penile swelling, scrotal swelling, testicular pain and urgency.   Skin: Negative for rash.       Objective    /76   Pulse 81   Temp (!) 101  F (38.3  C) (Oral)   Resp 18   Wt 67.6 kg (149 lb)   SpO2 98%   BMI 27.25 kg/m    Physical Exam  Vitals and nursing note reviewed.   Constitutional:       Appearance: Normal appearance.   HENT:      Head: Normocephalic.      Right Ear: Tympanic membrane, ear canal and external ear normal.      Left Ear: Tympanic membrane, ear canal and external ear normal.      Mouth/Throat:      Pharynx: Posterior oropharyngeal erythema present.   Eyes:      Conjunctiva/sclera: Conjunctivae normal.   Cardiovascular:      Rate and Rhythm: Normal rate.      Pulses: Normal pulses.      Heart sounds: Normal heart sounds.   Pulmonary:      Effort: Pulmonary effort is normal.      Breath sounds: Normal breath sounds.   Lymphadenopathy:      Cervical: Cervical adenopathy present.   Neurological:      Mental Status: He is alert.

## 2021-10-24 LAB
GROUP A STREP BY PCR: NOT DETECTED
SARS-COV-2 RNA RESP QL NAA+PROBE: NEGATIVE

## 2021-10-24 NOTE — PATIENT INSTRUCTIONS
Patient Education     Dysuria  Dysuria is when you have pain during urination. It is often described as a burning feeling. Learn more about this problem and how it can be treated.     Painful urination (dysuria) is often caused by a problem in the urinary tract.   What causes dysuria?  Possible causes include:    Infection with a bacteria or virus such as a urinary tract infection (UTI) or a sexually transmitted infection (STI)    Sensitivity or allergy to chemicals such as those found in lotions and other products    Prostate or bladder problems    Radiation therapy to the pelvic area  How is dysuria diagnosed?  Your healthcare provider will examine you. He or she will ask about your symptoms and health. After talking with you and doing a physical exam, your healthcare provider may know what is causing your dysuria. You will often have to give a urine sample. Tests of your urine (urinalysis) are done. A urinalysis may include:    Looking at the urine sample (visual exam)    Checking for substances (chemical exam)    Looking at a small amount of the urine under a microscope (microscopic exam)  Some parts of the urinalysis may be done in the provider's office and some in a lab. The urine sample may also be checked for bacteria and yeast (urine culture). Your healthcare provider will tell you more about these tests if they are needed.  How is dysuria treated?  Treatment depends on the cause. If you have a bacterial infection, you may need antibiotics. You may be given medicines to make it easier for you to urinate and help ease pain. Your healthcare provider can tell you more about your treatment options. If not treated, symptoms may get worse.  When to call your healthcare provider  Call the healthcare provider right away if you have any of the following:    Fever of  100.4  F ( 38 C) or higher, or as directed by your provider    No improvement after 3 days of treatment    Trouble urinating because of pain    New  or increased discharge from the vagina or penis    Rash or joint pain    Increased back or belly pain    Enlarged painful lymph nodes (lumps) in the groin  [employer] last reviewed this educational content on 4/1/2019 2000-2021 [employer]. All rights reserved. This information is not intended as a substitute for professional medical care. Always follow your healthcare professional's instructions.

## 2021-10-25 LAB
C TRACH DNA SPEC QL PROBE+SIG AMP: NEGATIVE
N GONORRHOEA DNA SPEC QL NAA+PROBE: NEGATIVE

## 2022-01-22 ENCOUNTER — HEALTH MAINTENANCE LETTER (OUTPATIENT)
Age: 27
End: 2022-01-22

## 2023-01-14 ENCOUNTER — HEALTH MAINTENANCE LETTER (OUTPATIENT)
Age: 28
End: 2023-01-14

## 2023-04-23 ENCOUNTER — HEALTH MAINTENANCE LETTER (OUTPATIENT)
Age: 28
End: 2023-04-23